# Patient Record
Sex: MALE | Race: WHITE | NOT HISPANIC OR LATINO | URBAN - METROPOLITAN AREA
[De-identification: names, ages, dates, MRNs, and addresses within clinical notes are randomized per-mention and may not be internally consistent; named-entity substitution may affect disease eponyms.]

---

## 2020-12-24 ENCOUNTER — CLINICAL SUPPORT (OUTPATIENT)
Dept: URGENT CARE | Facility: CLINIC | Age: 30
End: 2020-12-24
Payer: COMMERCIAL

## 2020-12-24 DIAGNOSIS — Z03.818 ENCOUNTER FOR OBSERVATION FOR SUSPECTED EXPOSURE TO OTHER BIOLOGICAL AGENTS RULED OUT: Primary | ICD-10-CM

## 2020-12-24 LAB
CTP QC/QA: YES
SARS-COV-2 RDRP RESP QL NAA+PROBE: NEGATIVE

## 2020-12-24 PROCEDURE — 99211 OFF/OP EST MAY X REQ PHY/QHP: CPT | Mod: S$GLB,,, | Performed by: NURSE PRACTITIONER

## 2020-12-24 PROCEDURE — U0002: ICD-10-PCS | Mod: QW,S$GLB,, | Performed by: NURSE PRACTITIONER

## 2020-12-24 PROCEDURE — 99211 PR OFFICE/OUTPT VISIT, EST, LEVL I: ICD-10-PCS | Mod: S$GLB,,, | Performed by: NURSE PRACTITIONER

## 2020-12-24 PROCEDURE — U0002 COVID-19 LAB TEST NON-CDC: HCPCS | Mod: QW,S$GLB,, | Performed by: NURSE PRACTITIONER

## 2024-12-23 ENCOUNTER — OFFICE VISIT (OUTPATIENT)
Dept: URGENT CARE | Facility: CLINIC | Age: 34
End: 2024-12-23
Payer: COMMERCIAL

## 2024-12-23 VITALS
WEIGHT: 195 LBS | OXYGEN SATURATION: 98 % | BODY MASS INDEX: 26.41 KG/M2 | DIASTOLIC BLOOD PRESSURE: 71 MMHG | RESPIRATION RATE: 20 BRPM | TEMPERATURE: 98 F | HEIGHT: 72 IN | HEART RATE: 108 BPM | SYSTOLIC BLOOD PRESSURE: 133 MMHG

## 2024-12-23 DIAGNOSIS — T80.90XA INJECTION SITE REACTION, INITIAL ENCOUNTER: ICD-10-CM

## 2024-12-23 DIAGNOSIS — Z91.89: ICD-10-CM

## 2024-12-23 DIAGNOSIS — L03.317 CELLULITIS OF BUTTOCK: Primary | ICD-10-CM

## 2024-12-23 PROCEDURE — 99213 OFFICE O/P EST LOW 20 MIN: CPT | Mod: S$GLB,,,

## 2024-12-23 RX ORDER — SULFAMETHOXAZOLE AND TRIMETHOPRIM 800; 160 MG/1; MG/1
1 TABLET ORAL 2 TIMES DAILY
Qty: 14 TABLET | Refills: 0 | Status: SHIPPED | OUTPATIENT
Start: 2024-12-23 | End: 2024-12-30

## 2024-12-23 RX ORDER — TESTOSTERONE CYPIONATE 200 MG/ML
200 INJECTION, SOLUTION INTRAMUSCULAR
COMMUNITY

## 2024-12-23 NOTE — PROGRESS NOTES
Subjective:      Patient ID: Xiang Rodriguez is a 34 y.o. male.    Vitals:  height is 6' (1.829 m) and weight is 88.5 kg (195 lb). His oral temperature is 98.1 °F (36.7 °C). His blood pressure is 133/71 and his pulse is 108. His respiration is 20 and oxygen saturation is 98%.     Chief Complaint: Abscess    This is a 34 y.o. male who presents today with a chief complaint of possible 2 abscess located on both gluteus started yesterday. Pt states that they ran out of needles to do his Tshots, and had to reuse needles.  Pt has not taken OTC medication to help with symptoms.     Provider note starts below:  Patient presents to clinic for evaluation of skin problem.  States he administers testosterone injections to himself at home.  He recently ran out of clean needles to do his injections so had to reuse his needles twice.  States he had to do injections in both buttocks over the past several days.  Since the injections, he has been experiencing redness and swelling to bilateral buttocks.  Symptoms onset the following day after injections.  States skin was initially itchy but sometimes painful as well.  He has done another injection since then in the thigh using a clean needle and has not had the same reaction on the leg.  Patient denies any fever, chills, nausea, vomiting, chest pain, shortness for breath.    Other  This is a new problem. The current episode started yesterday. The problem occurs constantly. The problem has been gradually worsening. Associated symptoms include a rash. Pertinent negatives include no abdominal pain, anorexia, arthralgias, change in bowel habit, chest pain, chills, congestion, coughing, diaphoresis, fatigue, fever, headaches, joint swelling, myalgias, nausea, neck pain, numbness, sore throat, swollen glands, urinary symptoms, vertigo, visual change, vomiting or weakness. He has tried nothing for the symptoms.       Constitution: Negative for chills, sweating, fatigue and fever.   HENT:   Negative for congestion and sore throat.    Neck: Negative for neck pain.   Cardiovascular:  Negative for chest pain.   Respiratory:  Negative for cough.    Gastrointestinal:  Negative for abdominal pain, nausea and vomiting.   Musculoskeletal:  Positive for pain. Negative for joint pain, joint swelling and muscle ache.   Skin:  Positive for rash and erythema. Negative for hives.   Allergic/Immunologic: Positive for itching. Negative for hives.   Neurological:  Negative for history of vertigo, headaches, disorientation, altered mental status, numbness and tingling.   Psychiatric/Behavioral:  Negative for altered mental status, disorientation and confusion.       Objective:     Physical Exam   Constitutional: He is oriented to person, place, and time.  Non-toxic appearance. He does not appear ill. No distress.   HENT:   Head: Normocephalic and atraumatic.   Eyes: Conjunctivae are normal. Extraocular movement intact   Pulmonary/Chest: Effort normal.   Abdominal: Normal appearance.   Musculoskeletal: Normal range of motion.         General: Normal range of motion.   Neurological: He is alert, oriented to person, place, and time and at baseline.   Skin: Skin is warm and dry. erythema         Comments: There is large area of induration, erythema, warmth, and mild tenderness noted to bilateral buttocks, left greater than right.  Erythema is blanchable.  No palpable abscess appreciated.  No vesicles, macules, papules or other lesions appreciated.   Psychiatric: His behavior is normal. Mood normal.   Nursing note and vitals reviewed.    Assessment:     1. Cellulitis of buttock    2. Injection site reaction, initial encounter    3. Does not clean needles        Plan:     Cellulitis of buttock  -     sulfamethoxazole-trimethoprim 800-160mg (BACTRIM DS) 800-160 mg Tab; Take 1 tablet by mouth 2 (two) times daily. for 7 days  Dispense: 14 tablet; Refill: 0    Injection site reaction, initial encounter    Does not clean  needles      Medical Decision Making:   Urgent Care Management:  Tetanus updated in 2022.   Risk of cellulitis due to use dirty needles.  We will start antibiotics to cover cellulitis.  Tylenol/ibuprofen if painful.  May be experiencing injection site reaction, however no reaction to the most recent injection.  Advised Zyrtec/Benadryl if itching occurs.       Patient Instructions   About this topic   Cellulitis is a skin infection. All people have germs on their skin. Most of the time, these germs do not cause a problem. A skin infection means the germs have gotten into the layers of your skin. Germs can enter your body from a cut, scratch, or bite. The infected part gets red, warm, painful, swollen, and irritated. You need antibiotics to treat the infection. It is important to take all of your antibiotics even if you start to feel better. If not, your infection may come back and be more serious than before.    Treatment  Bactrim twice daily for 7 days. This is an antibiotic to treat infection. Please take to completion. Take with food.  If not allergic, please take over the counter Tylenol (Acetaminophen) 650 mg every 6 to 8 hours and/or Motrin (Ibuprofen) 600 mg to 800 every 6-8 hours as directed for control of pain and/or fever.  For itching, take Zyrtec (cetirizine) 10 mg in the morning and Benadryl (diphenhydramine) 25 mg at night as needed.    Care at home   Keep your infected area clean and dry. Do not squeeze, scratch, or rub it. You can gently wash the area with soap and water or take a shower. Pat the area dry with a clean towel.  Wash your hands before and after you touch your infected area.  Do not put an antibiotic ointment on the infected area.  Use clean, warm compresses to help ease pain and swelling. Wet a clean wash cloth or small towel with hot water. Put it on the area for 5 to 10 minutes. If there is itching, warm/hot will worsen symptoms. For itching, use cool compresses instead.  You can draw  a line with a waterproof marker around the red area to see if it is getting bigger or smaller.    When do I need to call the doctor?   Signs of infection. These include a fever of 100.4°F (38°C) or higher, chills, or wound that will not heal.  You have a fever of 100.4°F (38°C) or higher or chills.  The area becomes more red, swollen, or painful.  The redness or swelling spreads up your leg or arm or to a larger area.  The infected area is not better after 3 days of taking antibiotics.    Should you develop any worsening or new symptoms after leaving urgent care, it is recommended that you go to the ER for further/repeat evaluation.      Follow up with your PCP in 3-5 days after your urgent care visit.     Please remember that you have received care at an urgent care today. Urgent cares are not emergency rooms and are not equipped to handle life threatening emergencies and cannot rule in or out certain medical conditions and you may be released before all of your medical problems are known or treated, please schedule all follow up appointments as discussed and if you have worsening symptoms please go to the ER to rule out potential life threatening problems, as discussed.

## 2024-12-23 NOTE — PATIENT INSTRUCTIONS
About this topic   Cellulitis is a skin infection. All people have germs on their skin. Most of the time, these germs do not cause a problem. A skin infection means the germs have gotten into the layers of your skin. Germs can enter your body from a cut, scratch, or bite. The infected part gets red, warm, painful, swollen, and irritated. You need antibiotics to treat the infection. It is important to take all of your antibiotics even if you start to feel better. If not, your infection may come back and be more serious than before.    Treatment  Bactrim twice daily for 7 days. This is an antibiotic to treat infection. Please take to completion. Take with food.  If not allergic, please take over the counter Tylenol (Acetaminophen) 650 mg every 6 to 8 hours and/or Motrin (Ibuprofen) 600 mg to 800 every 6-8 hours as directed for control of pain and/or fever.  For itching, take Zyrtec (cetirizine) 10 mg in the morning and Benadryl (diphenhydramine) 25 mg at night as needed.    Care at home   Keep your infected area clean and dry. Do not squeeze, scratch, or rub it. You can gently wash the area with soap and water or take a shower. Pat the area dry with a clean towel.  Wash your hands before and after you touch your infected area.  Do not put an antibiotic ointment on the infected area.  Use clean, warm compresses to help ease pain and swelling. Wet a clean wash cloth or small towel with hot water. Put it on the area for 5 to 10 minutes. If there is itching, warm/hot will worsen symptoms. For itching, use cool compresses instead.  You can draw a line with a waterproof marker around the red area to see if it is getting bigger or smaller.    When do I need to call the doctor?   Signs of infection. These include a fever of 100.4°F (38°C) or higher, chills, or wound that will not heal.  You have a fever of 100.4°F (38°C) or higher or chills.  The area becomes more red, swollen, or painful.  The redness or swelling spreads up  your leg or arm or to a larger area.  The infected area is not better after 3 days of taking antibiotics.    Should you develop any worsening or new symptoms after leaving urgent care, it is recommended that you go to the ER for further/repeat evaluation.      Follow up with your PCP in 3-5 days after your urgent care visit.     Please remember that you have received care at an urgent care today. Urgent cares are not emergency rooms and are not equipped to handle life threatening emergencies and cannot rule in or out certain medical conditions and you may be released before all of your medical problems are known or treated, please schedule all follow up appointments as discussed and if you have worsening symptoms please go to the ER to rule out potential life threatening problems, as discussed.